# Patient Record
Sex: MALE | Race: WHITE | NOT HISPANIC OR LATINO
[De-identification: names, ages, dates, MRNs, and addresses within clinical notes are randomized per-mention and may not be internally consistent; named-entity substitution may affect disease eponyms.]

---

## 2017-01-09 ENCOUNTER — APPOINTMENT (OUTPATIENT)
Dept: HEMATOLOGY ONCOLOGY | Facility: CLINIC | Age: 72
End: 2017-01-09

## 2017-01-09 VITALS
HEART RATE: 57 BPM | TEMPERATURE: 98.3 F | DIASTOLIC BLOOD PRESSURE: 85 MMHG | SYSTOLIC BLOOD PRESSURE: 166 MMHG | WEIGHT: 219 LBS | RESPIRATION RATE: 12 BRPM

## 2017-01-09 LAB
BASOPHILS # BLD: 0.02 TH/MM3
BASOPHILS NFR BLD: 0.5 %
EOSINOPHIL # BLD: 0.11 TH/MM3
EOSINOPHIL NFR BLD: 2.9 %
ERYTHROCYTE [DISTWIDTH] IN BLOOD BY AUTOMATED COUNT: 13.5 %
GRANULOCYTES # BLD: 2.38 TH/MM3
GRANULOCYTES NFR BLD: 62.1 %
HCT VFR BLD AUTO: 35.7 %
HGB BLD-MCNC: 12 G/DL
IMM GRANULOCYTES # BLD: 0.01 TH/MM3
IMM GRANULOCYTES NFR BLD: 0.3 %
LYMPHOCYTES # BLD: 0.9 TH/MM3
LYMPHOCYTES NFR BLD: 23.5 %
MCH RBC QN AUTO: 29.8 PG
MCHC RBC AUTO-ENTMCNC: 33.6 G/DL
MCV RBC AUTO: 88.6 FL
MONOCYTES # BLD: 0.41 TH/MM3
MONOCYTES NFR BLD: 10.7 %
PLATELET # BLD: 220 TH/MM3
PMV BLD AUTO: 10.4 FL
RBC # BLD AUTO: 4.03 MIL/MM3
WBC # BLD: 3.83 TH/MM3

## 2017-01-10 LAB
ALBUMIN SERPL-MCNC: 3.3 G/DL
ALBUMIN/GLOB SERPL: 0.62
ALP SERPL-CCNC: 49 IU/L
ALT SERPL-CCNC: 22 IU/L
ANION GAP SERPL CALC-SCNC: 13 MEQ/L
AST SERPL-CCNC: 23 IU/L
BILIRUB SERPL-MCNC: 0.8 MG/DL
BUN SERPL-MCNC: 12 MG/DL
BUN/CREAT SERPL: 13.3 %
CALCIUM SERPL-MCNC: 9.3 MG/DL
CHLORIDE SERPL-SCNC: 99 MEQ/L
CO2 SERPL-SCNC: 28 MEQ/L
CREAT SERPL-MCNC: 0.9 MG/DL
GFR SERPL CREATININE-BSD FRML MDRD: 84
GLUCOSE SERPL-MCNC: 82 MG/DL
LACTATE DEHYDROGENASE (NORTH): 120 IU/L
POTASSIUM SERPL-SCNC: 3.7 MMOL/L
PROT SERPL-MCNC: 8.6 G/DL
SODIUM SERPL-SCNC: 140 MEQ/L

## 2017-01-12 LAB
INTERPRETATION SERPL IFE-IMP: NORMAL
KAPPA LC FREE SER-MCNC: 4.8 MG/L
KAPPA LC FREE/LAMBDA FREE SER: 0.06
LAMBDA LC FREE SERPL-MCNC: 80.1 MG/L

## 2017-01-13 LAB
ALBUMIN MFR SERPL ELPH: 40.6 %
ALBUMIN SERPL ELPH-MCNC: 3.4 G/DL
ALBUMIN/GLOB SERPL ELPH: 0.7 ZZ
ALPHA1 GLOB MFR SERPL ELPH: 2.7 %
ALPHA1 GLOB SERPL ELPH-MCNC: 0.2 G/DL
ALPHA2 GLOB MFR SERPL ELPH: 7.4 %
ALPHA2 GLOB SERPL ELPH-MCNC: 0.6 G/DL
B-GLOBULIN SERPL ELPH-MCNC: 3.8 G/DL
BETA1 GLOB MFR SERPL ELPH: 45 %
GAMMA GLOB MFR SERPL ELPH: 4.3 %
GAMMA GLOB SERPL ELPH-MCNC: 0.4 G/DL
IGA FLD-MCNC: 3190 MG/DL
M PROTEIN MFR SERPL ELPH: 41.4 %
M-SPIKE SPE (NORTH): 3.5 G/DL
PROT PATTERN SERPL ELPH-IMP: 8.4 G/DL
PROT PATTERN SERPL ELPH-IMP: NORMAL
PROT SERPL-MCNC: 8.4 G/DL

## 2017-01-27 ENCOUNTER — RESULT REVIEW (OUTPATIENT)
Age: 72
End: 2017-01-27

## 2017-01-27 ENCOUNTER — APPOINTMENT (OUTPATIENT)
Dept: HEMATOLOGY ONCOLOGY | Facility: CLINIC | Age: 72
End: 2017-01-27

## 2017-01-27 VITALS
DIASTOLIC BLOOD PRESSURE: 93 MMHG | TEMPERATURE: 97.6 F | WEIGHT: 220 LBS | HEART RATE: 54 BPM | RESPIRATION RATE: 12 BRPM | SYSTOLIC BLOOD PRESSURE: 163 MMHG | HEIGHT: 70 IN | BODY MASS INDEX: 31.5 KG/M2

## 2017-01-30 ENCOUNTER — RESULT REVIEW (OUTPATIENT)
Age: 72
End: 2017-01-30

## 2017-02-21 LAB — SEND OUT TEST (NORTH): NORMAL

## 2017-03-01 ENCOUNTER — APPOINTMENT (OUTPATIENT)
Age: 72
End: 2017-03-01

## 2017-03-01 ENCOUNTER — OUTPATIENT (OUTPATIENT)
Dept: OUTPATIENT SERVICES | Facility: HOSPITAL | Age: 72
LOS: 1 days | Discharge: HOME | End: 2017-03-01

## 2017-03-01 DIAGNOSIS — Z00.00 ENCOUNTER FOR GENERAL ADULT MEDICAL EXAMINATION W/OUT ABNORMAL FINDINGS: ICD-10-CM

## 2017-03-01 DIAGNOSIS — C90.00 MULTIPLE MYELOMA NOT HAVING ACHIEVED REMISSION: ICD-10-CM

## 2017-03-02 LAB
ANION GAP SERPL CALC-SCNC: 13 MEQ/L
BUN SERPL-MCNC: 13 MG/DL
BUN/CREAT SERPL: 12.7 %
CALCIUM SERPL-MCNC: 9.1 MG/DL
CHLORIDE SERPL-SCNC: 99 MEQ/L
CO2 SERPL-SCNC: 29 MEQ/L
CREAT SERPL-MCNC: 1.02 MG/DL
GFR SERPL CREATININE-BSD FRML MDRD: 72
GLUCOSE SERPL-MCNC: 95 MG/DL
POTASSIUM SERPL-SCNC: 4 MMOL/L
SODIUM SERPL-SCNC: 141 MEQ/L

## 2017-03-28 ENCOUNTER — OUTPATIENT (OUTPATIENT)
Dept: OUTPATIENT SERVICES | Facility: HOSPITAL | Age: 72
LOS: 1 days | Discharge: HOME | End: 2017-03-28

## 2017-06-27 DIAGNOSIS — E88.09 OTHER DISORDERS OF PLASMA-PROTEIN METABOLISM, NOT ELSEWHERE CLASSIFIED: ICD-10-CM

## 2017-07-24 ENCOUNTER — RESULT REVIEW (OUTPATIENT)
Age: 72
End: 2017-07-24

## 2017-07-24 ENCOUNTER — APPOINTMENT (OUTPATIENT)
Dept: HEMATOLOGY ONCOLOGY | Facility: CLINIC | Age: 72
End: 2017-07-24

## 2017-07-24 ENCOUNTER — OUTPATIENT (OUTPATIENT)
Dept: OUTPATIENT SERVICES | Facility: HOSPITAL | Age: 72
LOS: 1 days | Discharge: HOME | End: 2017-07-24

## 2017-07-24 VITALS
RESPIRATION RATE: 12 BRPM | BODY MASS INDEX: 31.78 KG/M2 | DIASTOLIC BLOOD PRESSURE: 94 MMHG | WEIGHT: 222 LBS | TEMPERATURE: 98.6 F | SYSTOLIC BLOOD PRESSURE: 158 MMHG | HEART RATE: 54 BPM | HEIGHT: 70 IN

## 2017-07-25 DIAGNOSIS — C90.00 MULTIPLE MYELOMA NOT HAVING ACHIEVED REMISSION: ICD-10-CM

## 2017-11-06 ENCOUNTER — OUTPATIENT (OUTPATIENT)
Dept: OUTPATIENT SERVICES | Facility: HOSPITAL | Age: 72
LOS: 1 days | Discharge: HOME | End: 2017-11-06

## 2017-11-06 ENCOUNTER — APPOINTMENT (OUTPATIENT)
Dept: HEMATOLOGY ONCOLOGY | Facility: CLINIC | Age: 72
End: 2017-11-06

## 2017-11-06 ENCOUNTER — RESULT REVIEW (OUTPATIENT)
Age: 72
End: 2017-11-06

## 2017-11-06 VITALS
BODY MASS INDEX: 32.64 KG/M2 | RESPIRATION RATE: 12 BRPM | DIASTOLIC BLOOD PRESSURE: 94 MMHG | HEART RATE: 60 BPM | WEIGHT: 228 LBS | SYSTOLIC BLOOD PRESSURE: 150 MMHG | TEMPERATURE: 97.3 F | HEIGHT: 70 IN

## 2017-11-07 DIAGNOSIS — C90.00 MULTIPLE MYELOMA NOT HAVING ACHIEVED REMISSION: ICD-10-CM

## 2018-04-02 ENCOUNTER — LABORATORY RESULT (OUTPATIENT)
Age: 73
End: 2018-04-02

## 2018-04-02 ENCOUNTER — APPOINTMENT (OUTPATIENT)
Dept: HEMATOLOGY ONCOLOGY | Facility: CLINIC | Age: 73
End: 2018-04-02

## 2018-04-02 ENCOUNTER — OUTPATIENT (OUTPATIENT)
Dept: OUTPATIENT SERVICES | Facility: HOSPITAL | Age: 73
LOS: 1 days | Discharge: HOME | End: 2018-04-02

## 2018-04-02 VITALS — DIASTOLIC BLOOD PRESSURE: 103 MMHG | SYSTOLIC BLOOD PRESSURE: 167 MMHG

## 2018-04-02 VITALS
DIASTOLIC BLOOD PRESSURE: 96 MMHG | TEMPERATURE: 97.3 F | SYSTOLIC BLOOD PRESSURE: 182 MMHG | BODY MASS INDEX: 33.36 KG/M2 | WEIGHT: 233 LBS | HEIGHT: 70 IN

## 2018-04-02 LAB
HCT VFR BLD CALC: 38.1 %
HGB BLD-MCNC: 13 G/DL
MCHC RBC-ENTMCNC: 30.4 PG
MCHC RBC-ENTMCNC: 34.1 G/DL
MCV RBC AUTO: 89.2 FL
PLATELET # BLD AUTO: 206 K/UL
PMV BLD: 10.7 FL
RBC # BLD: 4.27 M/UL
RBC # FLD: 13.7 %
WBC # FLD AUTO: 4.93 K/UL

## 2018-04-03 LAB
ALBUMIN SERPL ELPH-MCNC: 3.7 G/DL
ALP BLD-CCNC: 53 U/L
ALT SERPL-CCNC: 16 U/L
ANION GAP SERPL CALC-SCNC: 15 MMOL/L
AST SERPL-CCNC: 19 U/L
BILIRUB SERPL-MCNC: 0.4 MG/DL
BUN SERPL-MCNC: 13 MG/DL
CALCIUM SERPL-MCNC: 9.5 MG/DL
CHLORIDE SERPL-SCNC: 98 MMOL/L
CO2 SERPL-SCNC: 29 MMOL/L
CREAT SERPL-MCNC: 1 MG/DL
GLUCOSE SERPL-MCNC: 89 MG/DL
IGA SER QL IEP: 3270 MG/DL
IGG SER QL IEP: 367 MG/DL
IGM SER QL IEP: 31 MG/DL
LDH SERPL-CCNC: 134 U/L
POTASSIUM SERPL-SCNC: 4.1 MMOL/L
PROT SERPL-MCNC: 8.3 G/DL
SODIUM SERPL-SCNC: 142 MMOL/L

## 2018-04-04 DIAGNOSIS — C90.00 MULTIPLE MYELOMA NOT HAVING ACHIEVED REMISSION: ICD-10-CM

## 2018-04-05 LAB
ALBUMIN MFR SERPL ELPH: 41.8 %
ALBUMIN SERPL-MCNC: 3.5 G/DL
ALBUMIN/GLOB SERPL: 0.7 RATIO
ALPHA1 GLOB MFR SERPL ELPH: 2.6 %
ALPHA1 GLOB SERPL ELPH-MCNC: 0.2 G/DL
ALPHA2 GLOB MFR SERPL ELPH: 7.4 %
ALPHA2 GLOB SERPL ELPH-MCNC: 0.6 G/DL
B-GLOBULIN MFR SERPL ELPH: 43.9 %
B-GLOBULIN SERPL ELPH-MCNC: 3.7 G/DL
FREE KAPPA URINE: 6.06 MG/L
FREE KAPPA/LAMDA RATIO: 2.45
FREE LAMDA URINE: 2.47 MG/L
GAMMA GLOB FLD ELPH-MCNC: 0.4 G/DL
GAMMA GLOB MFR SERPL ELPH: 4.2 %
INTERPRETATION SERPL IEP-IMP: NORMAL
M PROTEIN MFR SERPL ELPH: 35.7 %
M PROTEIN SPEC IFE-MCNC: NORMAL
MONOCLON BAND OBS SERPL: 3 G/DL
PROT SERPL-MCNC: 8.4 G/DL
PROT SERPL-MCNC: 8.4 G/DL

## 2018-09-06 ENCOUNTER — APPOINTMENT (OUTPATIENT)
Dept: HEMATOLOGY ONCOLOGY | Facility: CLINIC | Age: 73
End: 2018-09-06

## 2018-09-06 ENCOUNTER — LABORATORY RESULT (OUTPATIENT)
Age: 73
End: 2018-09-06

## 2018-09-06 ENCOUNTER — OUTPATIENT (OUTPATIENT)
Dept: OUTPATIENT SERVICES | Facility: HOSPITAL | Age: 73
LOS: 1 days | Discharge: HOME | End: 2018-09-06

## 2018-09-06 VITALS
TEMPERATURE: 96.8 F | BODY MASS INDEX: 32.93 KG/M2 | SYSTOLIC BLOOD PRESSURE: 168 MMHG | HEART RATE: 49 BPM | HEIGHT: 70 IN | RESPIRATION RATE: 14 BRPM | WEIGHT: 230 LBS | DIASTOLIC BLOOD PRESSURE: 85 MMHG

## 2018-09-06 LAB
ALBUMIN SERPL ELPH-MCNC: 3.5 G/DL
ALP BLD-CCNC: 54 U/L
ALT SERPL-CCNC: 19 U/L
ANION GAP SERPL CALC-SCNC: 16 MMOL/L
AST SERPL-CCNC: 25 U/L
BILIRUB SERPL-MCNC: 0.5 MG/DL
BUN SERPL-MCNC: 17 MG/DL
CALCIUM SERPL-MCNC: 9.2 MG/DL
CHLORIDE SERPL-SCNC: 99 MMOL/L
CO2 SERPL-SCNC: 27 MMOL/L
CREAT SERPL-MCNC: 1 MG/DL
GLUCOSE SERPL-MCNC: 88 MG/DL
HCT VFR BLD CALC: 36.6 %
HGB BLD-MCNC: 12.2 G/DL
MCHC RBC-ENTMCNC: 29.8 PG
MCHC RBC-ENTMCNC: 33.3 G/DL
MCV RBC AUTO: 89.5 FL
PLATELET # BLD AUTO: 180 K/UL
PMV BLD: 10.5 FL
POTASSIUM SERPL-SCNC: 3.9 MMOL/L
PROT SERPL-MCNC: 7.8 G/DL
RBC # BLD: 4.09 M/UL
RBC # FLD: 13.9 %
SODIUM SERPL-SCNC: 142 MMOL/L
WBC # FLD AUTO: 3.53 K/UL

## 2018-09-07 LAB
DEPRECATED KAPPA LC FREE/LAMBDA SER: 0.04 RATIO
IGA SER QL IEP: 3511 MG/DL
KAPPA LC CSF-MCNC: 9.51 MG/DL
KAPPA LC SERPL-MCNC: 0.38 MG/DL

## 2018-09-10 LAB
ALBUMIN MFR SERPL ELPH: 41.7 %
ALBUMIN SERPL-MCNC: 3.4 G/DL
ALBUMIN/GLOB SERPL: 0.7 RATIO
ALPHA1 GLOB MFR SERPL ELPH: 2.8 %
ALPHA1 GLOB SERPL ELPH-MCNC: 0.2 G/DL
ALPHA2 GLOB MFR SERPL ELPH: 7.8 %
ALPHA2 GLOB SERPL ELPH-MCNC: 0.6 G/DL
B-GLOBULIN MFR SERPL ELPH: 43.2 %
B-GLOBULIN SERPL ELPH-MCNC: 3.5 G/DL
GAMMA GLOB FLD ELPH-MCNC: 0.4 G/DL
GAMMA GLOB MFR SERPL ELPH: 4.5 %
INTERPRETATION SERPL IEP-IMP: NORMAL
M PROTEIN MFR SERPL ELPH: 30.9 %
M PROTEIN SPEC IFE-MCNC: NORMAL
MONOCLON BAND OBS SERPL: 2.5 G/DL
PROT SERPL-MCNC: 8.1 G/DL
PROT SERPL-MCNC: 8.1 G/DL

## 2018-11-06 DIAGNOSIS — C90.00 MULTIPLE MYELOMA NOT HAVING ACHIEVED REMISSION: ICD-10-CM

## 2019-03-07 ENCOUNTER — APPOINTMENT (OUTPATIENT)
Dept: HEMATOLOGY ONCOLOGY | Facility: CLINIC | Age: 74
End: 2019-03-07

## 2019-03-07 ENCOUNTER — LABORATORY RESULT (OUTPATIENT)
Age: 74
End: 2019-03-07

## 2019-03-07 ENCOUNTER — OUTPATIENT (OUTPATIENT)
Dept: OUTPATIENT SERVICES | Facility: HOSPITAL | Age: 74
LOS: 1 days | Discharge: HOME | End: 2019-03-07

## 2019-03-07 VITALS
TEMPERATURE: 98.8 F | WEIGHT: 230 LBS | HEART RATE: 50 BPM | RESPIRATION RATE: 14 BRPM | BODY MASS INDEX: 45.16 KG/M2 | DIASTOLIC BLOOD PRESSURE: 90 MMHG | HEIGHT: 60 IN | SYSTOLIC BLOOD PRESSURE: 170 MMHG

## 2019-03-07 LAB
ALBUMIN SERPL ELPH-MCNC: 3.6 G/DL
ALP BLD-CCNC: 53 U/L
ALT SERPL-CCNC: 18 U/L
ANION GAP SERPL CALC-SCNC: 15 MMOL/L
AST SERPL-CCNC: 21 U/L
BILIRUB SERPL-MCNC: 0.4 MG/DL
BUN SERPL-MCNC: 12 MG/DL
CALCIUM SERPL-MCNC: 9.4 MG/DL
CHLORIDE SERPL-SCNC: 98 MMOL/L
CO2 SERPL-SCNC: 28 MMOL/L
CREAT SERPL-MCNC: 1 MG/DL
GLUCOSE SERPL-MCNC: 90 MG/DL
HCT VFR BLD CALC: 38.1 %
HGB BLD-MCNC: 12.7 G/DL
LDH SERPL-CCNC: 136 U/L
MCHC RBC-ENTMCNC: 30.3 PG
MCHC RBC-ENTMCNC: 33.3 G/DL
MCV RBC AUTO: 90.9 FL
PLATELET # BLD AUTO: 209 K/UL
PMV BLD: 10.7 FL
POTASSIUM SERPL-SCNC: 4.1 MMOL/L
PROT SERPL-MCNC: 8.2 G/DL
RBC # BLD: 4.19 M/UL
RBC # FLD: 14.1 %
SODIUM SERPL-SCNC: 141 MMOL/L
WBC # FLD AUTO: 3.13 K/UL

## 2019-03-07 NOTE — ASSESSMENT
[FreeTextEntry1] : IgA multiple myeloma, clinically stable. However, his IgA level was climbing up very slowly. The patient was originally diagnosed with monoclonal gammopathy in the mid 90's.\par \par We will proceed with blood work again including CBC, CMP, SPEP with IFES, LDH, igA, free light chains.\par Further recommendations after the above results are completed. \par \par If all stable, he will be seen again in 6 months for follow up.\par \par All questions answered.

## 2019-03-07 NOTE — REVIEW OF SYSTEMS
[Vision Problems] : vision problems [Loss of Hearing] : loss of hearing [Insomnia] : insomnia [Negative] : Heme/Lymph

## 2019-03-07 NOTE — PHYSICAL EXAM
[Fully active, able to carry on all pre-disease performance without restriction] : Status 0 - Fully active, able to carry on all pre-disease performance without restriction [Normal] : grossly intact [de-identified] : But somewhat overweight [de-identified] : But decreased hearing [de-identified] : Mild arthritic changes

## 2019-03-07 NOTE — HISTORY OF PRESENT ILLNESS
[Disease:__________________________] : Disease: [unfilled] [de-identified] : The patient is coming for his regularly scheduled follow up for his IgA lambda plasma cell dyscrasia/myeloma.\par Since his last visit, he hasn't had any new problems.\par He has not felt any new lumps. Sometimes he gets night sweats only when he sleeps on the right side. \par No problems with bowel movements but the urine stream is a little weak.\par No cough or shortness of breath.\par \par He was started on an eye drop recently for the beginning of glaucoma.

## 2019-03-08 DIAGNOSIS — C90.00 MULTIPLE MYELOMA NOT HAVING ACHIEVED REMISSION: ICD-10-CM

## 2019-03-08 LAB
DEPRECATED KAPPA LC FREE/LAMBDA SER: 0.06 RATIO
IGA SER QL IEP: 3276 MG/DL
KAPPA LC CSF-MCNC: 7.17 MG/DL
KAPPA LC SERPL-MCNC: 0.41 MG/DL

## 2019-03-12 LAB
ALBUMIN MFR SERPL ELPH: 41.6 %
ALBUMIN SERPL-MCNC: 3.4 G/DL
ALBUMIN/GLOB SERPL: 0.7 RATIO
ALPHA1 GLOB MFR SERPL ELPH: 2.6 %
ALPHA1 GLOB SERPL ELPH-MCNC: 0.2 G/DL
ALPHA2 GLOB MFR SERPL ELPH: 7.2 %
ALPHA2 GLOB SERPL ELPH-MCNC: 0.6 G/DL
B-GLOBULIN MFR SERPL ELPH: 44.8 %
B-GLOBULIN SERPL ELPH-MCNC: 3.6 G/DL
GAMMA GLOB FLD ELPH-MCNC: 0.3 G/DL
GAMMA GLOB MFR SERPL ELPH: 3.8 %
INTERPRETATION SERPL IEP-IMP: NORMAL
M PROTEIN MFR SERPL ELPH: 27.6 %
M PROTEIN SPEC IFE-MCNC: NORMAL
MONOCLON BAND OBS SERPL: 2.2 G/DL
PROT SERPL-MCNC: 8.1 G/DL
PROT SERPL-MCNC: 8.1 G/DL

## 2019-10-03 ENCOUNTER — APPOINTMENT (OUTPATIENT)
Dept: HEMATOLOGY ONCOLOGY | Facility: CLINIC | Age: 74
End: 2019-10-03
Payer: MEDICARE

## 2019-10-03 ENCOUNTER — OUTPATIENT (OUTPATIENT)
Dept: OUTPATIENT SERVICES | Facility: HOSPITAL | Age: 74
LOS: 1 days | Discharge: HOME | End: 2019-10-03

## 2019-10-03 ENCOUNTER — LABORATORY RESULT (OUTPATIENT)
Age: 74
End: 2019-10-03

## 2019-10-03 VITALS
BODY MASS INDEX: 32.35 KG/M2 | WEIGHT: 226 LBS | HEIGHT: 70 IN | HEART RATE: 52 BPM | TEMPERATURE: 97.1 F | SYSTOLIC BLOOD PRESSURE: 178 MMHG | DIASTOLIC BLOOD PRESSURE: 96 MMHG | RESPIRATION RATE: 14 BRPM

## 2019-10-03 LAB
ALBUMIN SERPL ELPH-MCNC: 3.7 G/DL
ALP BLD-CCNC: 56 U/L
ALT SERPL-CCNC: 12 U/L
ANION GAP SERPL CALC-SCNC: 15 MMOL/L
AST SERPL-CCNC: 17 U/L
BILIRUB SERPL-MCNC: 0.5 MG/DL
BUN SERPL-MCNC: 17 MG/DL
CALCIUM SERPL-MCNC: 9.3 MG/DL
CHLORIDE SERPL-SCNC: 97 MMOL/L
CO2 SERPL-SCNC: 27 MMOL/L
CREAT SERPL-MCNC: 1.1 MG/DL
GLUCOSE SERPL-MCNC: 103 MG/DL
HCT VFR BLD CALC: 35.9 %
HGB BLD-MCNC: 12.1 G/DL
LDH SERPL-CCNC: 113 U/L
MCHC RBC-ENTMCNC: 30.3 PG
MCHC RBC-ENTMCNC: 33.7 G/DL
MCV RBC AUTO: 89.8 FL
PLATELET # BLD AUTO: 196 K/UL
PMV BLD: 10.4 FL
POTASSIUM SERPL-SCNC: 3.8 MMOL/L
PROT SERPL-MCNC: 8.3 G/DL
RBC # BLD: 4 M/UL
RBC # FLD: 13.3 %
SODIUM SERPL-SCNC: 139 MMOL/L
WBC # FLD AUTO: 3.29 K/UL

## 2019-10-03 PROCEDURE — 99213 OFFICE O/P EST LOW 20 MIN: CPT

## 2019-10-03 NOTE — ASSESSMENT
[FreeTextEntry1] : IgA multiple myeloma, practically smoldering but IgA hovering slightly above 3000 with no persistent progression, overall stable.\par \par We will repeat the blood work including a CBC, CMP, LDH, SPEP with IFES, IgA, free light chains. Further recommendations as needed after those results are available.\par \par All questions answered.\par \par If all stable, the patient will be seen again in 6 months for follow up.

## 2019-10-03 NOTE — HISTORY OF PRESENT ILLNESS
[Disease: _____________________] : Disease: [unfilled] [de-identified] : IgA [de-identified] : The patient is coming for his regularly scheduled follow up for his IgA myeloma. Presently, he has no new particular complaints. he had mild joint discomfort.\par Otherwise, the appetite is good. No significant problem with urine or bowel movements. No new headache or dizziness. No new cough  or shortness of breath. No fever or night sweats.\par He is on no new medications. \par He is up to date with his screenings.

## 2019-10-03 NOTE — PHYSICAL EXAM
[Fully active, able to carry on all pre-disease performance without restriction] : Status 0 - Fully active, able to carry on all pre-disease performance without restriction [Normal] : affect appropriate [de-identified] : Mild arthritic changes. [de-identified] : But somewhat overweight.

## 2019-10-07 DIAGNOSIS — C90.00 MULTIPLE MYELOMA NOT HAVING ACHIEVED REMISSION: ICD-10-CM

## 2019-10-07 LAB
ALBUMIN MFR SERPL ELPH: 41.7 %
ALBUMIN SERPL-MCNC: 3.5 G/DL
ALBUMIN/GLOB SERPL: 0.7 RATIO
ALPHA1 GLOB MFR SERPL ELPH: 2.7 %
ALPHA1 GLOB SERPL ELPH-MCNC: 0.2 G/DL
ALPHA2 GLOB MFR SERPL ELPH: 8 %
ALPHA2 GLOB SERPL ELPH-MCNC: 0.7 G/DL
B-GLOBULIN MFR SERPL ELPH: 43.9 %
B-GLOBULIN SERPL ELPH-MCNC: 3.6 G/DL
DEPRECATED KAPPA LC FREE/LAMBDA SER: 0.05 RATIO
GAMMA GLOB FLD ELPH-MCNC: 0.3 G/DL
GAMMA GLOB MFR SERPL ELPH: 3.7 %
IGA SER QL IEP: 3152 MG/DL
INTERPRETATION SERPL IEP-IMP: NORMAL
KAPPA LC CSF-MCNC: 9.2 MG/DL
KAPPA LC SERPL-MCNC: 0.48 MG/DL
M PROTEIN MFR SERPL ELPH: 34.8 %
M PROTEIN SPEC IFE-MCNC: NORMAL
MONOCLON BAND OBS SERPL: 2.9 G/DL
PROT SERPL-MCNC: 8.3 G/DL
PROT SERPL-MCNC: 8.3 G/DL

## 2020-04-13 ENCOUNTER — APPOINTMENT (OUTPATIENT)
Dept: HEMATOLOGY ONCOLOGY | Facility: CLINIC | Age: 75
End: 2020-04-13

## 2020-06-15 ENCOUNTER — OUTPATIENT (OUTPATIENT)
Dept: OUTPATIENT SERVICES | Facility: HOSPITAL | Age: 75
LOS: 1 days | Discharge: HOME | End: 2020-06-15

## 2020-06-15 ENCOUNTER — APPOINTMENT (OUTPATIENT)
Dept: HEMATOLOGY ONCOLOGY | Facility: CLINIC | Age: 75
End: 2020-06-15
Payer: MEDICARE

## 2020-06-15 ENCOUNTER — LABORATORY RESULT (OUTPATIENT)
Age: 75
End: 2020-06-15

## 2020-06-15 VITALS
DIASTOLIC BLOOD PRESSURE: 98 MMHG | TEMPERATURE: 98.1 F | BODY MASS INDEX: 31.78 KG/M2 | HEIGHT: 70 IN | RESPIRATION RATE: 14 BRPM | WEIGHT: 222 LBS | SYSTOLIC BLOOD PRESSURE: 178 MMHG | HEART RATE: 55 BPM

## 2020-06-15 DIAGNOSIS — D72.819 DECREASED WHITE BLOOD CELL COUNT, UNSPECIFIED: ICD-10-CM

## 2020-06-15 DIAGNOSIS — E88.09 OTHER DISORDERS OF PLASMA-PROTEIN METABOLISM, NOT ELSEWHERE CLASSIFIED: ICD-10-CM

## 2020-06-15 LAB
HCT VFR BLD CALC: 36.7 %
HGB BLD-MCNC: 12.3 G/DL
MCHC RBC-ENTMCNC: 30.4 PG
MCHC RBC-ENTMCNC: 33.5 G/DL
MCV RBC AUTO: 90.6 FL
PLATELET # BLD AUTO: 195 K/UL
PMV BLD: 10.5 FL
RBC # BLD: 4.05 M/UL
RBC # FLD: 14.3 %
WBC # FLD AUTO: 3.46 K/UL

## 2020-06-15 PROCEDURE — 99213 OFFICE O/P EST LOW 20 MIN: CPT

## 2020-06-16 LAB
ALBUMIN SERPL ELPH-MCNC: 3.8 G/DL
ALP BLD-CCNC: 54 U/L
ALT SERPL-CCNC: 15 U/L
ANION GAP SERPL CALC-SCNC: 17 MMOL/L
AST SERPL-CCNC: 22 U/L
BILIRUB DIRECT SERPL-MCNC: <0.2 MG/DL
BILIRUB INDIRECT SERPL-MCNC: >0.2 MG/DL
BILIRUB SERPL-MCNC: 0.4 MG/DL
BUN SERPL-MCNC: 15 MG/DL
CALCIUM SERPL-MCNC: 9.2 MG/DL
CHLORIDE SERPL-SCNC: 96 MMOL/L
CO2 SERPL-SCNC: 27 MMOL/L
CREAT SERPL-MCNC: 1 MG/DL
DEPRECATED KAPPA LC FREE/LAMBDA SER: 0.06 RATIO
GLUCOSE SERPL-MCNC: 88 MG/DL
IGG SER QL IEP: 337 MG/DL
IGM SER QL IEP: 40 MG/DL
KAPPA LC CSF-MCNC: 7.39 MG/DL
KAPPA LC SERPL-MCNC: 0.47 MG/DL
POTASSIUM SERPL-SCNC: 3.7 MMOL/L
PROT SERPL-MCNC: 8.4 G/DL
SODIUM SERPL-SCNC: 140 MMOL/L

## 2020-06-16 NOTE — PHYSICAL EXAM
[Fully active, able to carry on all pre-disease performance without restriction] : Status 0 - Fully active, able to carry on all pre-disease performance without restriction [Normal] : affect appropriate [de-identified] : But somewhat overweight. [de-identified] : Mild arthritic changes.

## 2020-06-16 NOTE — ASSESSMENT
[FreeTextEntry1] : IgA multiple myeloma, practically smoldering but IgA hovering slightly above 3000 with no persistent progression, overall stable, with no new symptoms or signs. No CRAB manifestations. No features of high risk smoldering myeloma either.\par \par We will repeat the blood work including a CBC, BMP, LFTs, LDH, SPEP with IFES, IgA, M, G levels, free light chains. Further recommendations as needed after those results are available.\par \par All questions answered.\par \par If all stable, the patient will be seen again in 6 months for follow up.

## 2020-06-16 NOTE — HISTORY OF PRESENT ILLNESS
[Disease: _____________________] : Disease: [unfilled] [de-identified] : The patient is coming for his regularly scheduled follow up for his IgA myeloma. \par Presently, he has no new particular complaints. \par Otherwise, the appetite is good. No significant problem with urine or bowel movements. No new headache or dizziness. No new cough  or shortness of breath. No fever or night sweats. No bone pains. No new episodes of seizures.\par He is on no new medications. \par He is up to date with his screenings. [de-identified] : IgA

## 2020-06-17 LAB
ALBUMIN MFR SERPL ELPH: 42.9 %
ALBUMIN SERPL-MCNC: 3.6 G/DL
ALBUMIN/GLOB SERPL: 0.8 RATIO
ALPHA1 GLOB MFR SERPL ELPH: 2.5 %
ALPHA1 GLOB SERPL ELPH-MCNC: 0.2 G/DL
ALPHA2 GLOB MFR SERPL ELPH: 6.9 %
ALPHA2 GLOB SERPL ELPH-MCNC: 0.6 G/DL
B-GLOBULIN MFR SERPL ELPH: 43.3 %
B-GLOBULIN SERPL ELPH-MCNC: 3.6 G/DL
GAMMA GLOB FLD ELPH-MCNC: 0.4 G/DL
GAMMA GLOB MFR SERPL ELPH: 4.4 %
IGA SER QL IEP: 355 MG/DL
INTERPRETATION SERPL IEP-IMP: NORMAL
M PROTEIN MFR SERPL ELPH: 32 %
M PROTEIN SPEC IFE-MCNC: NORMAL
MONOCLON BAND OBS SERPL: 2.7 G/DL
PROT SERPL-MCNC: 8.3 G/DL
PROT SERPL-MCNC: 8.3 G/DL

## 2020-06-19 DIAGNOSIS — D72.819 DECREASED WHITE BLOOD CELL COUNT, UNSPECIFIED: ICD-10-CM

## 2020-06-19 DIAGNOSIS — E88.09 OTHER DISORDERS OF PLASMA-PROTEIN METABOLISM, NOT ELSEWHERE CLASSIFIED: ICD-10-CM

## 2020-12-14 ENCOUNTER — LABORATORY RESULT (OUTPATIENT)
Age: 75
End: 2020-12-14

## 2020-12-14 ENCOUNTER — APPOINTMENT (OUTPATIENT)
Dept: HEMATOLOGY ONCOLOGY | Facility: CLINIC | Age: 75
End: 2020-12-14
Payer: MEDICARE

## 2020-12-14 ENCOUNTER — OUTPATIENT (OUTPATIENT)
Dept: OUTPATIENT SERVICES | Facility: HOSPITAL | Age: 75
LOS: 1 days | Discharge: HOME | End: 2020-12-14

## 2020-12-14 VITALS
BODY MASS INDEX: 31.35 KG/M2 | SYSTOLIC BLOOD PRESSURE: 170 MMHG | HEART RATE: 53 BPM | HEIGHT: 70 IN | TEMPERATURE: 97.6 F | DIASTOLIC BLOOD PRESSURE: 92 MMHG | WEIGHT: 219 LBS

## 2020-12-14 DIAGNOSIS — I10 ESSENTIAL (PRIMARY) HYPERTENSION: ICD-10-CM

## 2020-12-14 LAB
HCT VFR BLD CALC: 36.6 %
HGB BLD-MCNC: 12.2 G/DL
MCHC RBC-ENTMCNC: 30.3 PG
MCHC RBC-ENTMCNC: 33.3 G/DL
MCV RBC AUTO: 91 FL
PLATELET # BLD AUTO: 188 K/UL
PMV BLD: 10.6 FL
RBC # BLD: 4.02 M/UL
RBC # FLD: 13.2 %
WBC # FLD AUTO: 4.1 K/UL

## 2020-12-14 PROCEDURE — 99213 OFFICE O/P EST LOW 20 MIN: CPT

## 2020-12-14 NOTE — PHYSICAL EXAM
[Fully active, able to carry on all pre-disease performance without restriction] : Status 0 - Fully active, able to carry on all pre-disease performance without restriction [Normal] : affect appropriate [de-identified] : Mild arthritic changes

## 2020-12-14 NOTE — HISTORY OF PRESENT ILLNESS
[Disease:__________________________] : Disease: [unfilled] [de-identified] : The patient is coming for his regularly scheduled follow up for his smoldering multiple myeloma with IgA.\par The patient was doing quite well until about 2 weeks ago, remaining physically quite active, when her mother who will be 100 years old soon, fell and broke her hip and was hospitalized and underwent surgery. She was then sent to a rehabilitation center.\par Otherwise, the patient is denying any other particular problems. Earlier in the year, he was feeling somewhat fatigued but that sensation disappeared.\par No new medications. \par He had his last colonoscopy about a year ago and it was negative.

## 2020-12-14 NOTE — REVIEW OF SYSTEMS
[Recent Change In Weight] : ~T recent weight change [Loss of Hearing] : loss of hearing [Joint Stiffness] : joint stiffness [Muscle Pain] : muscle pain [Negative] : Heme/Lymph [FreeTextEntry2] : Went down to 218 but has regained weight. He wants to lose more. [FreeTextEntry4] : Mild middle range loss [FreeTextEntry9] : Mostly mid-arm (right) discomfort

## 2020-12-14 NOTE — ASSESSMENT
[FreeTextEntry1] : Smoldering myeloma with IgA, clinically stable.\par We will repeat the blood work including CBC, CMP, LDH, SPEP with IFES, IgA, free light chains. I don't see any reason for any further radiological studies at this time.\par \par Further recommendations after the above results are available.\par \par All questions answered. \par \par If all stable, the patient will be seen again in 6 months for follow up.

## 2020-12-15 LAB
ALBUMIN SERPL ELPH-MCNC: 3.8 G/DL
ALP BLD-CCNC: 60 U/L
ALT SERPL-CCNC: 14 U/L
ANION GAP SERPL CALC-SCNC: 13 MMOL/L
AST SERPL-CCNC: 19 U/L
BILIRUB SERPL-MCNC: 0.4 MG/DL
BUN SERPL-MCNC: 14 MG/DL
CALCIUM SERPL-MCNC: 9.7 MG/DL
CHLORIDE SERPL-SCNC: 99 MMOL/L
CO2 SERPL-SCNC: 28 MMOL/L
CREAT SERPL-MCNC: 1 MG/DL
DEPRECATED KAPPA LC FREE/LAMBDA SER: 0.08 RATIO
GLUCOSE SERPL-MCNC: 88 MG/DL
KAPPA LC CSF-MCNC: 6.45 MG/DL
KAPPA LC SERPL-MCNC: 0.5 MG/DL
LDH SERPL-CCNC: 124 U/L
POTASSIUM SERPL-SCNC: 3.9 MMOL/L
PROT SERPL-MCNC: 8.3 G/DL
SODIUM SERPL-SCNC: 140 MMOL/L

## 2020-12-16 LAB
ALBUMIN MFR SERPL ELPH: 43.1 %
ALBUMIN SERPL-MCNC: 3.6 G/DL
ALBUMIN/GLOB SERPL: 0.8 RATIO
ALPHA1 GLOB MFR SERPL ELPH: 2.6 %
ALPHA1 GLOB SERPL ELPH-MCNC: 0.2 G/DL
ALPHA2 GLOB MFR SERPL ELPH: 7.5 %
ALPHA2 GLOB SERPL ELPH-MCNC: 0.6 G/DL
B-GLOBULIN MFR SERPL ELPH: 42.4 %
B-GLOBULIN SERPL ELPH-MCNC: 3.5 G/DL
GAMMA GLOB FLD ELPH-MCNC: 0.4 G/DL
GAMMA GLOB MFR SERPL ELPH: 4.4 %
IGA SER QL IEP: 3507 MG/DL
INTERPRETATION SERPL IEP-IMP: NORMAL
M PROTEIN MFR SERPL ELPH: 29.2 %
M PROTEIN SPEC IFE-MCNC: NORMAL
MONOCLON BAND OBS SERPL: 2.4 G/DL
PROT SERPL-MCNC: 8.3 G/DL

## 2021-01-06 DIAGNOSIS — C90.00 MULTIPLE MYELOMA NOT HAVING ACHIEVED REMISSION: ICD-10-CM

## 2021-07-08 ENCOUNTER — OUTPATIENT (OUTPATIENT)
Dept: OUTPATIENT SERVICES | Facility: HOSPITAL | Age: 76
LOS: 1 days | Discharge: HOME | End: 2021-07-08

## 2021-07-08 ENCOUNTER — LABORATORY RESULT (OUTPATIENT)
Age: 76
End: 2021-07-08

## 2021-07-08 ENCOUNTER — APPOINTMENT (OUTPATIENT)
Dept: HEMATOLOGY ONCOLOGY | Facility: CLINIC | Age: 76
End: 2021-07-08
Payer: MEDICARE

## 2021-07-08 VITALS
SYSTOLIC BLOOD PRESSURE: 180 MMHG | HEART RATE: 56 BPM | HEIGHT: 70 IN | DIASTOLIC BLOOD PRESSURE: 88 MMHG | BODY MASS INDEX: 30.92 KG/M2 | WEIGHT: 216 LBS | RESPIRATION RATE: 14 BRPM | TEMPERATURE: 97.7 F

## 2021-07-08 DIAGNOSIS — D47.2 MONOCLONAL GAMMOPATHY: ICD-10-CM

## 2021-07-08 LAB
HCT VFR BLD CALC: 35.4 %
HGB BLD-MCNC: 11.8 G/DL
MCHC RBC-ENTMCNC: 30.3 PG
MCHC RBC-ENTMCNC: 33.3 G/DL
MCV RBC AUTO: 91 FL
PLATELET # BLD AUTO: 174 K/UL
PMV BLD: 10.7 FL
RBC # BLD: 3.89 M/UL
RBC # FLD: 13.4 %
WBC # FLD AUTO: 3.46 K/UL

## 2021-07-08 PROCEDURE — 99214 OFFICE O/P EST MOD 30 MIN: CPT

## 2021-07-08 NOTE — ASSESSMENT
[FreeTextEntry1] : 1. Multiple myeloma with IgA, smoldering but was slowly progressing. The history goes back to at least 1996 when he was first seen by us. The patient had at least 2 bone marrow studies in 2012 and 2017.  2. Right neck/posterior submandibular soft tissue mass. It has been there for many years without significant progression. The patient has been aware of it and more than 5 years ago he had a CT of the area. Old records no longer available at this time.  We will repeat the CBC, CMP, LDH, SPEP with IFES, free light chains, quantitative IgA.  If any progression noted, will repeat the bone marrow studies.   All questions answered.   Further recommendations after the above blood results are available and reviewed. The patient may need a follow up sooner than what has been scheduled in the past

## 2021-07-08 NOTE — HISTORY OF PRESENT ILLNESS
[Disease:__________________________] : Disease: [unfilled] [de-identified] : The patient is coming for his regularly scheduled follow up for his smoldering myeloma with IgA.\par Presently, he is denying any new particular complaints. He just has his chronic arthralgias. He is taking care of her elderly mother who is centenarian. She has recovered from her orthopedic problems.\par The patient himself feels well and remains fully functional.\par He is on no new medications but continuing with the previous ones and seeing his other physicians also regularly. [de-identified] : IgA

## 2021-07-08 NOTE — PHYSICAL EXAM
[Fully active, able to carry on all pre-disease performance without restriction] : Status 0 - Fully active, able to carry on all pre-disease performance without restriction [Normal] : affect appropriate [de-identified] : But somewhat overweight. [de-identified] : Right side submandibular fullness/soft mass (not new, has been there for several years). He had a neck CT a few years ago and no suspicious lesion identified. [de-identified] : Arthritic changes noted

## 2021-07-09 DIAGNOSIS — C90.00 MULTIPLE MYELOMA NOT HAVING ACHIEVED REMISSION: ICD-10-CM

## 2021-07-09 LAB
ALBUMIN SERPL ELPH-MCNC: 3.7 G/DL
ALP BLD-CCNC: 59 U/L
ALT SERPL-CCNC: 11 U/L
ANION GAP SERPL CALC-SCNC: 11 MMOL/L
AST SERPL-CCNC: 22 U/L
BILIRUB SERPL-MCNC: 0.6 MG/DL
BUN SERPL-MCNC: 15 MG/DL
CALCIUM SERPL-MCNC: 9.2 MG/DL
CHLORIDE SERPL-SCNC: 97 MMOL/L
CO2 SERPL-SCNC: 28 MMOL/L
CREAT SERPL-MCNC: 1 MG/DL
DEPRECATED KAPPA LC FREE/LAMBDA SER: 0.05 RATIO
GLUCOSE SERPL-MCNC: 92 MG/DL
IGA SER QL IEP: 2676 MG/DL
KAPPA LC CSF-MCNC: 9.37 MG/DL
KAPPA LC SERPL-MCNC: 0.48 MG/DL
LDH SERPL-CCNC: 130 U/L
POTASSIUM SERPL-SCNC: 4.1 MMOL/L
PROT SERPL-MCNC: 7.8 G/DL
SODIUM SERPL-SCNC: 136 MMOL/L

## 2021-07-14 LAB
ALBUMIN MFR SERPL ELPH: 40.6 %
ALBUMIN SERPL-MCNC: 3.2 G/DL
ALBUMIN/GLOB SERPL: 0.7 RATIO
ALPHA1 GLOB MFR SERPL ELPH: 2.6 %
ALPHA1 GLOB SERPL ELPH-MCNC: 0.2 G/DL
ALPHA2 GLOB MFR SERPL ELPH: 7.8 %
ALPHA2 GLOB SERPL ELPH-MCNC: 0.6 G/DL
B-GLOBULIN MFR SERPL ELPH: 44.8 %
B-GLOBULIN SERPL ELPH-MCNC: 3.5 G/DL
GAMMA GLOB FLD ELPH-MCNC: 0.3 G/DL
GAMMA GLOB MFR SERPL ELPH: 4.2 %
INTERPRETATION SERPL IEP-IMP: NORMAL
M PROTEIN MFR SERPL ELPH: 32.9 %
M PROTEIN SPEC IFE-MCNC: NORMAL
MONOCLON BAND OBS SERPL: 2.6 G/DL
PROT SERPL-MCNC: 7.8 G/DL
PROT SERPL-MCNC: 7.8 G/DL

## 2021-10-06 PROBLEM — I10 ESSENTIAL HYPERTENSION: Status: ACTIVE | Noted: 2020-12-14

## 2022-01-24 ENCOUNTER — APPOINTMENT (OUTPATIENT)
Dept: HEMATOLOGY ONCOLOGY | Facility: CLINIC | Age: 77
End: 2022-01-24
Payer: MEDICARE

## 2022-01-24 ENCOUNTER — OUTPATIENT (OUTPATIENT)
Dept: OUTPATIENT SERVICES | Facility: HOSPITAL | Age: 77
LOS: 1 days | Discharge: HOME | End: 2022-01-24

## 2022-01-24 ENCOUNTER — LABORATORY RESULT (OUTPATIENT)
Age: 77
End: 2022-01-24

## 2022-01-24 VITALS
HEIGHT: 70 IN | SYSTOLIC BLOOD PRESSURE: 180 MMHG | BODY MASS INDEX: 31.21 KG/M2 | RESPIRATION RATE: 14 BRPM | HEART RATE: 52 BPM | WEIGHT: 218 LBS | TEMPERATURE: 98.6 F | DIASTOLIC BLOOD PRESSURE: 76 MMHG

## 2022-01-24 DIAGNOSIS — C90.00 MULTIPLE MYELOMA NOT HAVING ACHIEVED REMISSION: ICD-10-CM

## 2022-01-24 LAB
ALBUMIN SERPL ELPH-MCNC: 3.7 G/DL
ALP BLD-CCNC: 61 U/L
ALT SERPL-CCNC: 15 U/L
ANION GAP SERPL CALC-SCNC: 19 MMOL/L
AST SERPL-CCNC: 19 U/L
BILIRUB SERPL-MCNC: 0.4 MG/DL
BUN SERPL-MCNC: 12 MG/DL
CALCIUM SERPL-MCNC: 9.1 MG/DL
CHLORIDE SERPL-SCNC: 98 MMOL/L
CO2 SERPL-SCNC: 23 MMOL/L
CREAT SERPL-MCNC: 1 MG/DL
GLUCOSE SERPL-MCNC: 95 MG/DL
HCT VFR BLD CALC: 36.8 %
HGB BLD-MCNC: 12.7 G/DL
LDH SERPL-CCNC: 137 U/L
MCHC RBC-ENTMCNC: 30.9 PG
MCHC RBC-ENTMCNC: 34.5 G/DL
MCV RBC AUTO: 89.5 FL
PLATELET # BLD AUTO: 200 K/UL
PMV BLD: 10.3 FL
POTASSIUM SERPL-SCNC: 3.9 MMOL/L
PROT SERPL-MCNC: 8.5 G/DL
RBC # BLD: 4.11 M/UL
RBC # FLD: 13.6 %
SODIUM SERPL-SCNC: 140 MMOL/L
WBC # FLD AUTO: 3.63 K/UL

## 2022-01-24 PROCEDURE — 99214 OFFICE O/P EST MOD 30 MIN: CPT

## 2022-01-24 NOTE — PHYSICAL EXAM
[Normal] : affect appropriate [Fully active, able to carry on all pre-disease performance without restriction] : Status 0 - Fully active, able to carry on all pre-disease performance without restriction [de-identified] : Mild arthritic changes.

## 2022-01-24 NOTE — HISTORY OF PRESENT ILLNESS
[Disease:__________________________] : Disease: [unfilled] [de-identified] : The patient is coming for his regularly scheduled follow up for his multiple myeloma with IgA lambda.\par The patient was recently seen by his urologist for his slightly elevated PSA at 4.2. MRI was obtained and reported as negative for any malignancy. \par Otherwise, the patient is denying any significant new changes.\par His IgA was a little lower; at his last visit it was 2676 down from 3506.\par Overall, he has no significant new complaint. He still has the same mild arthritic complaints. [de-identified] : IgA

## 2022-01-24 NOTE — ASSESSMENT
[FreeTextEntry1] : 1. Multiple myeloma with IgA lambda, clinically stable as well as with biologic markers.\par We will order CBC, CMP, LDH, IgA, SPEP with IFES.\par The patient has not shown clinical or biological progression with his hemogram remaining stable over even years and his IgA fluctuation between 2600 (his last measurement in July and 3520 back in November of last year).\par 2. Leukopenia, chronic, asymptomatic.\par 3. Borderline high PSA.He is followed regularly also by his urologist for his borderline PSA but negative MRI for malignancy.\par \par Further recommendations after the above results are available.\par \par If all stable again, he will be seen again in 6 months for follow up.\par

## 2022-01-24 NOTE — REVIEW OF SYSTEMS
[Joint Pain] : joint pain [Joint Stiffness] : joint stiffness [Negative] : Heme/Lymph [Recent Change In Weight] : ~T no recent weight change

## 2022-01-25 DIAGNOSIS — C90.00 MULTIPLE MYELOMA NOT HAVING ACHIEVED REMISSION: ICD-10-CM

## 2022-01-25 LAB
DEPRECATED KAPPA LC FREE/LAMBDA SER: 0.07 RATIO
IGA SER QL IEP: 3776 MG/DL
KAPPA LC CSF-MCNC: 9.23 MG/DL
KAPPA LC SERPL-MCNC: 0.61 MG/DL

## 2022-01-28 LAB
ALBUMIN MFR SERPL ELPH: 41.7 %
ALBUMIN SERPL-MCNC: 3.5 G/DL
ALBUMIN/GLOB SERPL: 0.7 RATIO
ALPHA1 GLOB MFR SERPL ELPH: 2.7 %
ALPHA1 GLOB SERPL ELPH-MCNC: 0.2 G/DL
ALPHA2 GLOB MFR SERPL ELPH: 7.9 %
ALPHA2 GLOB SERPL ELPH-MCNC: 0.7 G/DL
B-GLOBULIN MFR SERPL ELPH: 45.5 %
B-GLOBULIN SERPL ELPH-MCNC: 3.9 G/DL
GAMMA GLOB FLD ELPH-MCNC: 0.4 G/DL
GAMMA GLOB MFR SERPL ELPH: 4.3 %
INTERPRETATION SERPL IEP-IMP: NORMAL
M PROTEIN MFR SERPL ELPH: 33.9 %
M PROTEIN SPEC IFE-MCNC: NORMAL
MONOCLON BAND OBS SERPL: 2.9 G/DL
PROT SERPL-MCNC: 8.5 G/DL
PROT SERPL-MCNC: 8.5 G/DL

## 2022-07-25 ENCOUNTER — LABORATORY RESULT (OUTPATIENT)
Age: 77
End: 2022-07-25

## 2022-07-25 ENCOUNTER — OUTPATIENT (OUTPATIENT)
Dept: OUTPATIENT SERVICES | Facility: HOSPITAL | Age: 77
LOS: 1 days | Discharge: HOME | End: 2022-07-25

## 2022-07-25 ENCOUNTER — APPOINTMENT (OUTPATIENT)
Dept: HEMATOLOGY ONCOLOGY | Facility: CLINIC | Age: 77
End: 2022-07-25

## 2022-07-25 VITALS
WEIGHT: 220 LBS | DIASTOLIC BLOOD PRESSURE: 93 MMHG | SYSTOLIC BLOOD PRESSURE: 194 MMHG | HEART RATE: 56 BPM | BODY MASS INDEX: 31.5 KG/M2 | TEMPERATURE: 97.7 F | RESPIRATION RATE: 14 BRPM | HEIGHT: 70 IN

## 2022-07-25 LAB
ALBUMIN SERPL ELPH-MCNC: 3.6 G/DL
ALP BLD-CCNC: 63 U/L
ALT SERPL-CCNC: 14 U/L
ANION GAP SERPL CALC-SCNC: 15 MMOL/L
AST SERPL-CCNC: 20 U/L
BILIRUB SERPL-MCNC: 0.3 MG/DL
BUN SERPL-MCNC: 16 MG/DL
CALCIUM SERPL-MCNC: 8.9 MG/DL
CHLORIDE SERPL-SCNC: 99 MMOL/L
CO2 SERPL-SCNC: 26 MMOL/L
CREAT SERPL-MCNC: 0.9 MG/DL
EGFR: 90 ML/MIN/1.73M2
GLUCOSE SERPL-MCNC: 86 MG/DL
HCT VFR BLD CALC: 35.3 %
HGB BLD-MCNC: 12.1 G/DL
LDH SERPL-CCNC: 148 U/L
MCHC RBC-ENTMCNC: 30.9 PG
MCHC RBC-ENTMCNC: 34.3 G/DL
MCV RBC AUTO: 90.3 FL
PLATELET # BLD AUTO: 217 K/UL
PMV BLD: 9.7 FL
POTASSIUM SERPL-SCNC: 4.6 MMOL/L
PROT SERPL-MCNC: 8.2 G/DL
RBC # BLD: 3.91 M/UL
RBC # FLD: 13.2 %
SODIUM SERPL-SCNC: 140 MMOL/L
URATE SERPL-MCNC: 7.5 MG/DL
WBC # FLD AUTO: 4.49 K/UL

## 2022-07-25 PROCEDURE — 99214 OFFICE O/P EST MOD 30 MIN: CPT

## 2022-07-25 NOTE — HISTORY OF PRESENT ILLNESS
[Disease:__________________________] : Disease: [unfilled] [de-identified] : The patient is coming for his regularly scheduled follow up for his IgA lambda multiple myeloma, stage I. It has been stable for several years now.\par He has been in a lot of stress lately with family issues. He is also taking care of his 89 year of female. \par In addition, while losing weight, he developed right lower extremity pain which eventually resolved. He started taking an antiinflammatory medication for his gout and the pain apparently resolved. Had minimal swelling of the leg which also had improved.\par No new medications. \par Last colonoscopy was 2-3 years ago and it was negative.\par He was seen also by his neurologist and repeat EEG was negative. However, he was recommended to continue with his Keppra.

## 2022-07-25 NOTE — REVIEW OF SYSTEMS
[Loss of Hearing] : loss of hearing [Lower Ext Edema] : lower extremity edema [Joint Pain] : joint pain [Negative] : Heme/Lymph [de-identified] : But "restless sleep"

## 2022-07-25 NOTE — ASSESSMENT
[FreeTextEntry1] : IgA lambda multiple myeloma, stage I, clinically stable. on no treatment, although the paraproteinemia was progressing slowly over the last few years but had fluctuated between 2600's and 3700's.\par Originally the monoclonal IgA was detected in the mid-90's.\par The situation was discussed with the patient.\par We will repeat his work up including CBC, CMP, LDH, SPEP with IFES, IgA, Free light chains.\par \par Further recommendations after the above results are available.\par \par All questions answered.\par \par If all the above stable, he will be seen again in 6 months for follow up.

## 2022-07-25 NOTE — PHYSICAL EXAM
[Fully active, able to carry on all pre-disease performance without restriction] : Status 0 - Fully active, able to carry on all pre-disease performance without restriction [Normal] : affect appropriate [de-identified] : But somewhat overweight [de-identified] : May be trace edema right lower extremity [de-identified] : Some arthritic changes

## 2022-07-26 DIAGNOSIS — C90.00 MULTIPLE MYELOMA NOT HAVING ACHIEVED REMISSION: ICD-10-CM

## 2022-07-26 LAB
DEPRECATED KAPPA LC FREE/LAMBDA SER: 0.08 RATIO
IGA SER QL IEP: 3183 MG/DL
KAPPA LC CSF-MCNC: 8.92 MG/DL
KAPPA LC SERPL-MCNC: 0.7 MG/DL

## 2022-08-01 LAB
ALBUMIN MFR SERPL ELPH: 39.9 %
ALBUMIN SERPL-MCNC: 3.3 G/DL
ALBUMIN/GLOB SERPL: 0.7 RATIO
ALPHA1 GLOB MFR SERPL ELPH: 2.8 %
ALPHA1 GLOB SERPL ELPH-MCNC: 0.2 G/DL
ALPHA2 GLOB MFR SERPL ELPH: 7.7 %
ALPHA2 GLOB SERPL ELPH-MCNC: 0.6 G/DL
B-GLOBULIN MFR SERPL ELPH: 45.6 %
B-GLOBULIN SERPL ELPH-MCNC: 3.7 G/DL
DEPRECATED KAPPA LC FREE/LAMBDA SER: 0.08 RATIO
GAMMA GLOB FLD ELPH-MCNC: 0.3 G/DL
GAMMA GLOB MFR SERPL ELPH: 4 %
IGA SER QL IEP: 3183 MG/DL
IGG SER QL IEP: 367 MG/DL
IGM SER QL IEP: 38 MG/DL
INTERPRETATION SERPL IEP-IMP: NORMAL
KAPPA LC CSF-MCNC: 8.92 MG/DL
KAPPA LC SERPL-MCNC: 0.7 MG/DL
M PROTEIN MFR SERPL ELPH: 41.1 %
M PROTEIN SPEC IFE-MCNC: NORMAL
MONOCLON BAND OBS SERPL: 3.4 G/DL
PROT SERPL-MCNC: 8.2 G/DL

## 2023-01-23 ENCOUNTER — OUTPATIENT (OUTPATIENT)
Dept: OUTPATIENT SERVICES | Facility: HOSPITAL | Age: 78
LOS: 1 days | End: 2023-01-23

## 2023-01-23 ENCOUNTER — APPOINTMENT (OUTPATIENT)
Dept: HEMATOLOGY ONCOLOGY | Facility: CLINIC | Age: 78
End: 2023-01-23
Payer: MEDICARE

## 2023-01-23 ENCOUNTER — LABORATORY RESULT (OUTPATIENT)
Age: 78
End: 2023-01-23

## 2023-01-23 VITALS
HEIGHT: 70 IN | WEIGHT: 219 LBS | BODY MASS INDEX: 31.35 KG/M2 | DIASTOLIC BLOOD PRESSURE: 86 MMHG | TEMPERATURE: 98.6 F | SYSTOLIC BLOOD PRESSURE: 157 MMHG | HEART RATE: 60 BPM | RESPIRATION RATE: 16 BRPM

## 2023-01-23 LAB
ALBUMIN SERPL ELPH-MCNC: 3.6 G/DL
ALP BLD-CCNC: 66 U/L
ALT SERPL-CCNC: 14 U/L
ANION GAP SERPL CALC-SCNC: 12 MMOL/L
AST SERPL-CCNC: 19 U/L
BILIRUB SERPL-MCNC: 0.5 MG/DL
BUN SERPL-MCNC: 13 MG/DL
CALCIUM SERPL-MCNC: 8.9 MG/DL
CHLORIDE SERPL-SCNC: 101 MMOL/L
CO2 SERPL-SCNC: 28 MMOL/L
CREAT SERPL-MCNC: 1 MG/DL
EGFR: 78 ML/MIN/1.73M2
GLUCOSE SERPL-MCNC: 95 MG/DL
HCT VFR BLD CALC: 36.5 %
HGB BLD-MCNC: 12.2 G/DL
LDH SERPL-CCNC: 132 U/L
MCHC RBC-ENTMCNC: 29.8 PG
MCHC RBC-ENTMCNC: 33.4 G/DL
MCV RBC AUTO: 89.2 FL
PLATELET # BLD AUTO: 186 K/UL
PMV BLD: 10.4 FL
POTASSIUM SERPL-SCNC: 4.2 MMOL/L
PROT SERPL-MCNC: 8 G/DL
RBC # BLD: 4.09 M/UL
RBC # FLD: 13.4 %
SODIUM SERPL-SCNC: 141 MMOL/L
WBC # FLD AUTO: 3.66 K/UL

## 2023-01-23 PROCEDURE — 99214 OFFICE O/P EST MOD 30 MIN: CPT

## 2023-01-23 NOTE — HISTORY OF PRESENT ILLNESS
[Disease:__________________________] : Disease: [unfilled] [de-identified] : The patient is coming for his IgA multiple myeloma.\par His major complaints are related to pain in his right thumb and bunions in his feet which may be reducing the scope of his activities.\par Denying any fever or night sweats. No problems with urine or bowel problems. \par No cough or shortness of breath.\par The appetite is good.\par His IgA has been fluctuating in the low and mid 3100 to 3700 range (the last one from past July being down to 3183 with an M-spike around 3.4 and a kappa/lambda ratio of 0.08 but lambda light chain of only 8.92 and kappa light chain 0.70).

## 2023-01-23 NOTE — PHYSICAL EXAM
[Fully active, able to carry on all pre-disease performance without restriction] : Status 0 - Fully active, able to carry on all pre-disease performance without restriction [Normal] : affect appropriate [de-identified] : But somewhat overweight [de-identified] : But somewhat decreased hearing [de-identified] : Arthritic changes

## 2023-01-23 NOTE — ASSESSMENT
[FreeTextEntry1] : IgA myeloma, clinically stable. No new symptoms. The patient is remaining essentially asymptomatic.\par The situation was discussed with the patient.\par We will repeat the myeloma work up (see below).\par Further recommendations after the above results are available.\par \par All questions answered.\par \par If all stable, the patient will be seen again in 5-6 months for follow up, or sooner if he develops new symptoms.

## 2023-01-24 DIAGNOSIS — C90.00 MULTIPLE MYELOMA NOT HAVING ACHIEVED REMISSION: ICD-10-CM

## 2023-01-24 LAB
DEPRECATED KAPPA LC FREE/LAMBDA SER: 0.08 RATIO
IGA SER QL IEP: 2826 MG/DL
KAPPA LC CSF-MCNC: 11.59 MG/DL
KAPPA LC SERPL-MCNC: 0.91 MG/DL

## 2023-01-27 LAB
ALBUMIN MFR SERPL ELPH: 41.9 %
ALBUMIN SERPL-MCNC: 3.3 G/DL
ALBUMIN/GLOB SERPL: 0.7 RATIO
ALPHA1 GLOB MFR SERPL ELPH: 3.1 %
ALPHA1 GLOB SERPL ELPH-MCNC: 0.2 G/DL
ALPHA2 GLOB MFR SERPL ELPH: 8.5 %
ALPHA2 GLOB SERPL ELPH-MCNC: 0.7 G/DL
B-GLOBULIN MFR SERPL ELPH: 42.7 %
B-GLOBULIN SERPL ELPH-MCNC: 3.3 G/DL
GAMMA GLOB FLD ELPH-MCNC: 0.3 G/DL
GAMMA GLOB MFR SERPL ELPH: 3.8 %
INTERPRETATION SERPL IEP-IMP: NORMAL
M PROTEIN MFR SERPL ELPH: 36.6 %
M PROTEIN SPEC IFE-MCNC: NORMAL
MONOCLON BAND OBS SERPL: 2.9 G/DL
PROT SERPL-MCNC: 7.8 G/DL
PROT SERPL-MCNC: 7.8 G/DL

## 2023-08-01 ENCOUNTER — LABORATORY RESULT (OUTPATIENT)
Age: 78
End: 2023-08-01

## 2023-08-01 ENCOUNTER — APPOINTMENT (OUTPATIENT)
Dept: HEMATOLOGY ONCOLOGY | Facility: CLINIC | Age: 78
End: 2023-08-01
Payer: MEDICARE

## 2023-08-01 ENCOUNTER — OUTPATIENT (OUTPATIENT)
Dept: OUTPATIENT SERVICES | Facility: HOSPITAL | Age: 78
LOS: 1 days | End: 2023-08-01
Payer: MEDICARE

## 2023-08-01 VITALS
WEIGHT: 220 LBS | HEIGHT: 70 IN | TEMPERATURE: 98 F | SYSTOLIC BLOOD PRESSURE: 190 MMHG | DIASTOLIC BLOOD PRESSURE: 88 MMHG | BODY MASS INDEX: 31.5 KG/M2 | HEART RATE: 56 BPM

## 2023-08-01 DIAGNOSIS — C90.00 MULTIPLE MYELOMA NOT HAVING ACHIEVED REMISSION: ICD-10-CM

## 2023-08-01 LAB
ALBUMIN SERPL ELPH-MCNC: 3.9 G/DL
ALP BLD-CCNC: 60 U/L
ALT SERPL-CCNC: 14 U/L
ANION GAP SERPL CALC-SCNC: 12 MMOL/L
AST SERPL-CCNC: 17 U/L
BILIRUB SERPL-MCNC: 0.4 MG/DL
BUN SERPL-MCNC: 14 MG/DL
CALCIUM SERPL-MCNC: 9.6 MG/DL
CHLORIDE SERPL-SCNC: 99 MMOL/L
CO2 SERPL-SCNC: 29 MMOL/L
CREAT SERPL-MCNC: 0.9 MG/DL
EGFR: 89 ML/MIN/1.73M2
GLUCOSE SERPL-MCNC: 97 MG/DL
HCT VFR BLD CALC: 36.8 %
HGB BLD-MCNC: 12.5 G/DL
LDH SERPL-CCNC: 120 U/L
MCHC RBC-ENTMCNC: 30.6 PG
MCHC RBC-ENTMCNC: 34 G/DL
MCV RBC AUTO: 90 FL
PLATELET # BLD AUTO: 201 K/UL
PMV BLD: 10.5 FL
POTASSIUM SERPL-SCNC: 4.2 MMOL/L
PROT SERPL-MCNC: 8.3 G/DL
RBC # BLD: 4.09 M/UL
RBC # FLD: 13.4 %
SODIUM SERPL-SCNC: 140 MMOL/L
WBC # FLD AUTO: 3.67 K/UL

## 2023-08-01 PROCEDURE — 86334 IMMUNOFIX E-PHORESIS SERUM: CPT

## 2023-08-01 PROCEDURE — 83521 IG LIGHT CHAINS FREE EACH: CPT

## 2023-08-01 PROCEDURE — 84155 ASSAY OF PROTEIN SERUM: CPT

## 2023-08-01 PROCEDURE — 80053 COMPREHEN METABOLIC PANEL: CPT

## 2023-08-01 PROCEDURE — 99214 OFFICE O/P EST MOD 30 MIN: CPT

## 2023-08-01 PROCEDURE — 85027 COMPLETE CBC AUTOMATED: CPT

## 2023-08-01 PROCEDURE — 84165 PROTEIN E-PHORESIS SERUM: CPT

## 2023-08-01 PROCEDURE — 83615 LACTATE (LD) (LDH) ENZYME: CPT

## 2023-08-01 PROCEDURE — 82784 ASSAY IGA/IGD/IGG/IGM EACH: CPT

## 2023-08-01 NOTE — ASSESSMENT
[FreeTextEntry1] : IgA lambda multiple myeloma, smoldering, clinically stable, on no treatment for the time being. The situation was discussed with the patient. Will reevaluate the disease status with CBC, CMP, LDH, SPEP with IFES, free light chains, quantitative IgA. Further recommendations after the above completed. If all stable, the patient will be seen again in 6 months for follow up.  All questions answered.

## 2023-08-01 NOTE — HISTORY OF PRESENT ILLNESS
[Disease:__________________________] : Disease: [unfilled] [de-identified] : The patient is coming for his regularly scheduled follow-up for his IgA lambda multiple myeloma continuing on observation. Presently, he has no new particular complaints except her chronic joint aches especially of the lower extremities. He is on no new medications. [de-identified] : IgA lambda

## 2023-08-01 NOTE — PHYSICAL EXAM
[Fully active, able to carry on all pre-disease performance without restriction] : Status 0 - Fully active, able to carry on all pre-disease performance without restriction [Obese] : obese [Normal] : affect appropriate [de-identified] : Just mild arthritic changes

## 2023-08-02 DIAGNOSIS — C90.00 MULTIPLE MYELOMA NOT HAVING ACHIEVED REMISSION: ICD-10-CM

## 2023-08-02 LAB
DEPRECATED KAPPA LC FREE/LAMBDA SER: 0.07 RATIO
IGA SER QL IEP: 3276 MG/DL
KAPPA LC CSF-MCNC: 9.44 MG/DL
KAPPA LC SERPL-MCNC: 0.66 MG/DL

## 2023-08-03 LAB
ALBUMIN MFR SERPL ELPH: 42.3 %
ALBUMIN SERPL-MCNC: 3.5 G/DL
ALBUMIN/GLOB SERPL: 0.7 RATIO
ALPHA1 GLOB MFR SERPL ELPH: 2.9 %
ALPHA1 GLOB SERPL ELPH-MCNC: 0.2 G/DL
ALPHA2 GLOB MFR SERPL ELPH: 8 %
ALPHA2 GLOB SERPL ELPH-MCNC: 0.7 G/DL
B-GLOBULIN MFR SERPL ELPH: 42.9 %
B-GLOBULIN SERPL ELPH-MCNC: 3.5 G/DL
GAMMA GLOB FLD ELPH-MCNC: 0.3 G/DL
GAMMA GLOB MFR SERPL ELPH: 3.9 %
INTERPRETATION SERPL IEP-IMP: NORMAL
M PROTEIN MFR SERPL ELPH: 39.7 %
M PROTEIN SPEC IFE-MCNC: NORMAL
MONOCLON BAND OBS SERPL: 3.3 G/DL
PROT SERPL-MCNC: 8.2 G/DL
PROT SERPL-MCNC: 8.2 G/DL

## 2023-08-14 ENCOUNTER — TRANSCRIPTION ENCOUNTER (OUTPATIENT)
Age: 78
End: 2023-08-14

## 2024-02-05 ENCOUNTER — OUTPATIENT (OUTPATIENT)
Dept: OUTPATIENT SERVICES | Facility: HOSPITAL | Age: 79
LOS: 1 days | End: 2024-02-05
Payer: MEDICARE

## 2024-02-05 ENCOUNTER — LABORATORY RESULT (OUTPATIENT)
Age: 79
End: 2024-02-05

## 2024-02-05 ENCOUNTER — APPOINTMENT (OUTPATIENT)
Dept: HEMATOLOGY ONCOLOGY | Facility: CLINIC | Age: 79
End: 2024-02-05
Payer: MEDICARE

## 2024-02-05 VITALS
HEIGHT: 70 IN | BODY MASS INDEX: 31.5 KG/M2 | SYSTOLIC BLOOD PRESSURE: 191 MMHG | RESPIRATION RATE: 18 BRPM | TEMPERATURE: 98.6 F | WEIGHT: 220 LBS | OXYGEN SATURATION: 99 % | DIASTOLIC BLOOD PRESSURE: 100 MMHG

## 2024-02-05 DIAGNOSIS — C90.00 MULTIPLE MYELOMA NOT HAVING ACHIEVED REMISSION: ICD-10-CM

## 2024-02-05 DIAGNOSIS — D64.9 ANEMIA, UNSPECIFIED: ICD-10-CM

## 2024-02-05 LAB
ALBUMIN SERPL ELPH-MCNC: 3.7 G/DL
ALP BLD-CCNC: 58 U/L
ALT SERPL-CCNC: 14 U/L
ANION GAP SERPL CALC-SCNC: 17 MMOL/L
AST SERPL-CCNC: 18 U/L
BILIRUB SERPL-MCNC: 0.5 MG/DL
BUN SERPL-MCNC: 13 MG/DL
CALCIUM SERPL-MCNC: 9.4 MG/DL
CHLORIDE SERPL-SCNC: 98 MMOL/L
CO2 SERPL-SCNC: 25 MMOL/L
CREAT SERPL-MCNC: 0.9 MG/DL
EGFR: 89 ML/MIN/1.73M2
GLUCOSE SERPL-MCNC: 102 MG/DL
HCT VFR BLD CALC: 35.2 %
HGB BLD-MCNC: 12.3 G/DL
LDH SERPL-CCNC: 123 U/L
MCHC RBC-ENTMCNC: 31.3 PG
MCHC RBC-ENTMCNC: 34.9 G/DL
MCV RBC AUTO: 89.6 FL
PLATELET # BLD AUTO: 196 K/UL
PMV BLD: 10 FL
POTASSIUM SERPL-SCNC: 4.3 MMOL/L
PROT SERPL-MCNC: 8.2 G/DL
RBC # BLD: 3.93 M/UL
RBC # FLD: 13.3 %
SODIUM SERPL-SCNC: 140 MMOL/L
WBC # FLD AUTO: 4.13 K/UL

## 2024-02-05 PROCEDURE — 86334 IMMUNOFIX E-PHORESIS SERUM: CPT

## 2024-02-05 PROCEDURE — 84155 ASSAY OF PROTEIN SERUM: CPT

## 2024-02-05 PROCEDURE — 83521 IG LIGHT CHAINS FREE EACH: CPT

## 2024-02-05 PROCEDURE — 80053 COMPREHEN METABOLIC PANEL: CPT

## 2024-02-05 PROCEDURE — 85027 COMPLETE CBC AUTOMATED: CPT

## 2024-02-05 PROCEDURE — 99214 OFFICE O/P EST MOD 30 MIN: CPT

## 2024-02-05 PROCEDURE — 82784 ASSAY IGA/IGD/IGG/IGM EACH: CPT

## 2024-02-05 PROCEDURE — 84165 PROTEIN E-PHORESIS SERUM: CPT

## 2024-02-05 PROCEDURE — 83615 LACTATE (LD) (LDH) ENZYME: CPT

## 2024-02-05 NOTE — END OF VISIT
[FreeTextEntry3] : I was present with the PA during the key portions of the history and exam. I agree with the findings and plan as documented in the PA's note, unless noted below.

## 2024-02-05 NOTE — ASSESSMENT
[FreeTextEntry1] : IgA lambda multiple myeloma, smoldering, clinically stable, on no treatment for the time being.  The situation was discussed with the patient. Will reevaluate the disease status with CBC, CMP, LDH, SPEP with IFES, free light chains, quantitative IgA. Further recommendations after the above completed. If all stable, the patient will be seen again in 6 months for follow up.  All questions answered.  Case discussed and patient seen with Dr. Drake.

## 2024-02-05 NOTE — PHYSICAL EXAM
[Fully active, able to carry on all pre-disease performance without restriction] : Status 0 - Fully active, able to carry on all pre-disease performance without restriction [Obese] : obese [Normal] : affect appropriate [de-identified] : ? tumefaction of the parotid glands [de-identified] : trace edema B/L [de-identified] : Just mild arthritic changes

## 2024-02-05 NOTE — REVIEW OF SYSTEMS
[Joint Pain] : joint pain [Negative] : Heme/Lymph [FreeTextEntry9] : Especially lower extremities, arthritis

## 2024-02-05 NOTE — HISTORY OF PRESENT ILLNESS
[Disease:__________________________] : Disease: [unfilled] [de-identified] : The patient is coming for his regularly scheduled follow-up for his IgA lambda multiple myeloma continuing on observation. Presently, he has no new particular complaints. Overall he is doing well. He is on no new medications. [de-identified] : IgA lambda

## 2024-02-06 DIAGNOSIS — D64.9 ANEMIA, UNSPECIFIED: ICD-10-CM

## 2024-02-06 LAB
DEPRECATED KAPPA LC FREE/LAMBDA SER: 0.09 RATIO
IGA SER QL IEP: 3901 MG/DL
KAPPA LC CSF-MCNC: 8.82 MG/DL
KAPPA LC SERPL-MCNC: 0.81 MG/DL

## 2024-02-08 LAB
ALBUMIN MFR SERPL ELPH: 42.1 %
ALBUMIN SERPL-MCNC: 3.5 G/DL
ALBUMIN/GLOB SERPL: 0.7 RATIO
ALPHA1 GLOB MFR SERPL ELPH: 2.8 %
ALPHA1 GLOB SERPL ELPH-MCNC: 0.2 G/DL
ALPHA2 GLOB MFR SERPL ELPH: 7.9 %
ALPHA2 GLOB SERPL ELPH-MCNC: 0.6 G/DL
B-GLOBULIN MFR SERPL ELPH: 42.3 %
B-GLOBULIN SERPL ELPH-MCNC: 3.5 G/DL
GAMMA GLOB FLD ELPH-MCNC: 0.4 G/DL
GAMMA GLOB MFR SERPL ELPH: 4.9 %
INTERPRETATION SERPL IEP-IMP: NORMAL
M PROTEIN MFR SERPL ELPH: 38.6 %
M PROTEIN SPEC IFE-MCNC: NORMAL
MONOCLON BAND OBS SERPL: 3.2 G/DL
PROT SERPL-MCNC: 8.2 G/DL

## 2024-08-19 ENCOUNTER — OUTPATIENT (OUTPATIENT)
Dept: OUTPATIENT SERVICES | Facility: HOSPITAL | Age: 79
LOS: 1 days | End: 2024-08-19
Payer: MEDICARE

## 2024-08-19 ENCOUNTER — LABORATORY RESULT (OUTPATIENT)
Age: 79
End: 2024-08-19

## 2024-08-19 ENCOUNTER — APPOINTMENT (OUTPATIENT)
Age: 79
End: 2024-08-19

## 2024-08-19 VITALS
WEIGHT: 221 LBS | DIASTOLIC BLOOD PRESSURE: 92 MMHG | RESPIRATION RATE: 16 BRPM | HEART RATE: 70 BPM | OXYGEN SATURATION: 96 % | SYSTOLIC BLOOD PRESSURE: 176 MMHG | BODY MASS INDEX: 31.64 KG/M2 | TEMPERATURE: 97.8 F | HEIGHT: 70 IN

## 2024-08-19 DIAGNOSIS — D64.9 ANEMIA, UNSPECIFIED: ICD-10-CM

## 2024-08-19 DIAGNOSIS — C90.00 MULTIPLE MYELOMA NOT HAVING ACHIEVED REMISSION: ICD-10-CM

## 2024-08-19 LAB
ALBUMIN SERPL ELPH-MCNC: 3.7 G/DL
ALP BLD-CCNC: 63 U/L
ALT SERPL-CCNC: 12 U/L
ANION GAP SERPL CALC-SCNC: 15 MMOL/L
AST SERPL-CCNC: 18 U/L
BILIRUB SERPL-MCNC: 0.4 MG/DL
BUN SERPL-MCNC: 13 MG/DL
CALCIUM SERPL-MCNC: 9.2 MG/DL
CHLORIDE SERPL-SCNC: 98 MMOL/L
CO2 SERPL-SCNC: 27 MMOL/L
CREAT SERPL-MCNC: 1 MG/DL
EGFR: 78 ML/MIN/1.73M2
GLUCOSE SERPL-MCNC: 94 MG/DL
HCT VFR BLD CALC: 33.5 %
HGB BLD-MCNC: 11.7 G/DL
LDH SERPL-CCNC: 141 U/L
MCHC RBC-ENTMCNC: 30.4 PG
MCHC RBC-ENTMCNC: 34.9 G/DL
MCV RBC AUTO: 87 FL
PLATELET # BLD AUTO: 193 K/UL
PMV BLD: 10.1 FL
POTASSIUM SERPL-SCNC: 3.7 MMOL/L
PROT SERPL-MCNC: 7.9 G/DL
RBC # BLD: 3.85 M/UL
RBC # FLD: 13.9 %
SODIUM SERPL-SCNC: 140 MMOL/L
WBC # FLD AUTO: 4.1 K/UL

## 2024-08-19 PROCEDURE — 83521 IG LIGHT CHAINS FREE EACH: CPT

## 2024-08-19 PROCEDURE — 99214 OFFICE O/P EST MOD 30 MIN: CPT

## 2024-08-19 PROCEDURE — 80053 COMPREHEN METABOLIC PANEL: CPT

## 2024-08-19 PROCEDURE — 83615 LACTATE (LD) (LDH) ENZYME: CPT

## 2024-08-19 PROCEDURE — 82784 ASSAY IGA/IGD/IGG/IGM EACH: CPT

## 2024-08-19 PROCEDURE — 84165 PROTEIN E-PHORESIS SERUM: CPT

## 2024-08-19 PROCEDURE — 85027 COMPLETE CBC AUTOMATED: CPT

## 2024-08-19 PROCEDURE — 84155 ASSAY OF PROTEIN SERUM: CPT

## 2024-08-19 PROCEDURE — 86334 IMMUNOFIX E-PHORESIS SERUM: CPT

## 2024-08-19 NOTE — ASSESSMENT
[FreeTextEntry1] : -- IgA lambda multiple myeloma, smoldering, clinically stable, on no treatment for the time being. -- R renal oncocytoma, being followed at [facility].   The situation was discussed with the patient. Will reevaluate the disease status with CBC, CMP, LDH, SPEP with IFES, free light chains, quantitative IgA. Further recommendations after the above completed. If all stable, the patient will be seen again in 6 months for follow up. All questions answered.  Case discussed and patient seen with Dr. Drake who agreed with the above.

## 2024-08-19 NOTE — HISTORY OF PRESENT ILLNESS
[Disease:__________________________] : Disease: [unfilled] [de-identified] : The patient is coming for his regularly scheduled follow-up for his IgA lambda multiple myeloma continuing on observation. Presently, he has no new particular complaints. Overall he is doing well. He was recently found to have R renal mass, status post partial nephrectomy at [facility]. Pathology consistent with oncocytoma. He will be followed by repeat CT scan scheduled for December. He is on no new medications. [de-identified] : IgA lambda

## 2024-08-19 NOTE — PHYSICAL EXAM
[Fully active, able to carry on all pre-disease performance without restriction] : Status 0 - Fully active, able to carry on all pre-disease performance without restriction [Obese] : obese [Normal] : affect appropriate [de-identified] : submandibular soft movable lipoma [de-identified] : trace edema B/L [de-identified] : Just mild arthritic changes

## 2024-08-19 NOTE — HISTORY OF PRESENT ILLNESS
[Disease:__________________________] : Disease: [unfilled] [de-identified] : The patient is coming for his regularly scheduled follow-up for his IgA lambda multiple myeloma continuing on observation. Presently, he has no new particular complaints. Overall he is doing well. He was recently found to have R renal mass, status post partial nephrectomy at [facility]. Pathology consistent with oncocytoma. He will be followed by repeat CT scan scheduled for December. He is on no new medications. [de-identified] : IgA lambda

## 2024-08-19 NOTE — ASSESSMENT
[FreeTextEntry1] : -- IgA lambda multiple myeloma, smoldering, clinically stable, on no treatment for the time being. 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   The situation was discussed with the patient. Will reevaluate the disease status with CBC, CMP, LDH, SPEP with IFES, free light chains, quantitative IgA. Further recommendations after the above completed. If all stable, the patient will be seen again in 6 months for follow up. All questions answered.  Case discussed and patient seen with Dr. Drake who agreed with the above.

## 2024-08-19 NOTE — PHYSICAL EXAM
[Fully active, able to carry on all pre-disease performance without restriction] : Status 0 - Fully active, able to carry on all pre-disease performance without restriction [Obese] : obese [Normal] : affect appropriate [de-identified] : submandibular soft movable lipoma [de-identified] : trace edema B/L [de-identified] : Just mild arthritic changes

## 2024-08-20 DIAGNOSIS — D64.9 ANEMIA, UNSPECIFIED: ICD-10-CM

## 2024-08-20 LAB
DEPRECATED KAPPA LC FREE/LAMBDA SER: 0.07 RATIO
IGA SER QL IEP: 2944 MG/DL
KAPPA LC CSF-MCNC: 9.1 MG/DL
KAPPA LC SERPL-MCNC: 0.61 MG/DL

## 2024-08-21 LAB
ALBUMIN MFR SERPL ELPH: 42.5 %
ALBUMIN SERPL-MCNC: 3.4 G/DL
ALBUMIN/GLOB SERPL: 0.7 RATIO
ALPHA1 GLOB MFR SERPL ELPH: 2.8 %
ALPHA1 GLOB SERPL ELPH-MCNC: 0.2 G/DL
ALPHA2 GLOB MFR SERPL ELPH: 7.9 %
ALPHA2 GLOB SERPL ELPH-MCNC: 0.6 G/DL
B-GLOBULIN MFR SERPL ELPH: 42.5 %
B-GLOBULIN SERPL ELPH-MCNC: 3.4 G/DL
GAMMA GLOB FLD ELPH-MCNC: 0.3 G/DL
GAMMA GLOB MFR SERPL ELPH: 4.3 %
INTERPRETATION SERPL IEP-IMP: NORMAL
M PROTEIN MFR SERPL ELPH: 38.8 %
M PROTEIN SPEC IFE-MCNC: NORMAL
MONOCLON BAND OBS SERPL: 3.1 G/DL
PROT SERPL-MCNC: 8.1 G/DL
PROT SERPL-MCNC: 8.1 G/DL

## 2025-02-14 ENCOUNTER — OUTPATIENT (OUTPATIENT)
Dept: OUTPATIENT SERVICES | Facility: HOSPITAL | Age: 80
LOS: 1 days | End: 2025-02-14
Payer: MEDICARE

## 2025-02-14 ENCOUNTER — APPOINTMENT (OUTPATIENT)
Age: 80
End: 2025-02-14
Payer: MEDICARE

## 2025-02-14 VITALS
SYSTOLIC BLOOD PRESSURE: 174 MMHG | BODY MASS INDEX: 31.78 KG/M2 | TEMPERATURE: 97.6 F | OXYGEN SATURATION: 99 % | RESPIRATION RATE: 16 BRPM | HEIGHT: 70 IN | DIASTOLIC BLOOD PRESSURE: 91 MMHG | WEIGHT: 222 LBS | HEART RATE: 60 BPM

## 2025-02-14 DIAGNOSIS — C90.00 MULTIPLE MYELOMA NOT HAVING ACHIEVED REMISSION: ICD-10-CM

## 2025-02-14 DIAGNOSIS — D64.9 ANEMIA, UNSPECIFIED: ICD-10-CM

## 2025-02-14 LAB
ALBUMIN SERPL ELPH-MCNC: 3.8 G/DL
ALP BLD-CCNC: 71 U/L
ALT SERPL-CCNC: 12 U/L
ANION GAP SERPL CALC-SCNC: 16 MMOL/L
AST SERPL-CCNC: 18 U/L
AUTO BASOPHILS #: 0.02 K/UL
AUTO BASOPHILS %: 0.5 %
AUTO EOSINOPHILS #: 0.25 K/UL
AUTO EOSINOPHILS %: 6 %
AUTO IMMATURE GRANULOCYTES #: 0.01 K/UL
AUTO LYMPHOCYTES #: 1.15 K/UL
AUTO LYMPHOCYTES %: 27.4 %
AUTO MONOCYTES #: 0.46 K/UL
AUTO MONOCYTES %: 11 %
AUTO NEUTROPHILS #: 2.31 K/UL
AUTO NEUTROPHILS %: 54.9 %
AUTO NRBC #: 0 K/UL
BILIRUB SERPL-MCNC: 0.5 MG/DL
BUN SERPL-MCNC: 13 MG/DL
CALCIUM SERPL-MCNC: 9.1 MG/DL
CHLORIDE SERPL-SCNC: 98 MMOL/L
CO2 SERPL-SCNC: 25 MMOL/L
CREAT SERPL-MCNC: 0.9 MG/DL
EGFR: 88 ML/MIN/1.73M2
GLUCOSE SERPL-MCNC: 86 MG/DL
HCT VFR BLD CALC: 37 %
HGB BLD-MCNC: 12.7 G/DL
IMM GRANULOCYTES NFR BLD AUTO: 0.2 %
LDH SERPL-CCNC: 141 U/L
MAN DIFF?: NORMAL
MCHC RBC-ENTMCNC: 31 PG
MCHC RBC-ENTMCNC: 34.3 G/DL
MCV RBC AUTO: 90.2 FL
PLATELET # BLD AUTO: 195 K/UL
PMV BLD AUTO: 0 /100 WBCS
PMV BLD: 10.3 FL
POTASSIUM SERPL-SCNC: 3.9 MMOL/L
PROT SERPL-MCNC: 8 G/DL
RBC # BLD: 4.1 M/UL
RBC # FLD: 13.3 %
SODIUM SERPL-SCNC: 139 MMOL/L
WBC # FLD AUTO: 4.2 K/UL

## 2025-02-14 PROCEDURE — 84155 ASSAY OF PROTEIN SERUM: CPT

## 2025-02-14 PROCEDURE — 99214 OFFICE O/P EST MOD 30 MIN: CPT

## 2025-02-14 PROCEDURE — 83615 LACTATE (LD) (LDH) ENZYME: CPT

## 2025-02-14 PROCEDURE — 82784 ASSAY IGA/IGD/IGG/IGM EACH: CPT

## 2025-02-14 PROCEDURE — 80053 COMPREHEN METABOLIC PANEL: CPT

## 2025-02-14 PROCEDURE — 85025 COMPLETE CBC W/AUTO DIFF WBC: CPT

## 2025-02-14 PROCEDURE — 84165 PROTEIN E-PHORESIS SERUM: CPT

## 2025-02-14 PROCEDURE — 83521 IG LIGHT CHAINS FREE EACH: CPT

## 2025-02-14 PROCEDURE — 86334 IMMUNOFIX E-PHORESIS SERUM: CPT

## 2025-02-15 DIAGNOSIS — D64.9 ANEMIA, UNSPECIFIED: ICD-10-CM

## 2025-02-18 LAB
DEPRECATED KAPPA LC FREE/LAMBDA SER: 0.18 RATIO
IGA SER QL IEP: 2774 MG/DL
KAPPA LC CSF-MCNC: 10.07 MG/DL
KAPPA LC SERPL-MCNC: 1.86 MG/DL

## 2025-02-21 LAB
ALBUMIN MFR SERPL ELPH: 43.1 %
ALBUMIN SERPL-MCNC: 3.4 G/DL
ALBUMIN/GLOB SERPL: 0.7 RATIO
ALPHA1 GLOB MFR SERPL ELPH: 2.7 %
ALPHA1 GLOB SERPL ELPH-MCNC: 0.2 G/DL
ALPHA2 GLOB MFR SERPL ELPH: 8.1 %
ALPHA2 GLOB SERPL ELPH-MCNC: 0.6 G/DL
B-GLOBULIN MFR SERPL ELPH: 41.9 %
B-GLOBULIN SERPL ELPH-MCNC: 3.4 G/DL
GAMMA GLOB FLD ELPH-MCNC: 0.3 G/DL
GAMMA GLOB MFR SERPL ELPH: 4.2 %
INTERPRETATION SERPL IEP-IMP: NORMAL
M PROTEIN MFR SERPL ELPH: 37.5 %
M PROTEIN SPEC IFE-MCNC: NORMAL
MONOCLON BAND OBS SERPL: 3 G/DL
PROT SERPL-MCNC: 8 G/DL
PROT SERPL-MCNC: 8 G/DL

## 2025-08-18 ENCOUNTER — APPOINTMENT (OUTPATIENT)
Age: 80
End: 2025-08-18
Payer: MEDICARE

## 2025-08-18 VITALS
SYSTOLIC BLOOD PRESSURE: 168 MMHG | TEMPERATURE: 97.8 F | DIASTOLIC BLOOD PRESSURE: 78 MMHG | HEART RATE: 60 BPM | OXYGEN SATURATION: 99 % | RESPIRATION RATE: 16 BRPM

## 2025-08-18 DIAGNOSIS — D47.2 MONOCLONAL GAMMOPATHY: ICD-10-CM

## 2025-08-18 LAB
ALBUMIN SERPL ELPH-MCNC: 4 G/DL
ALP BLD-CCNC: 67 U/L
ALT SERPL-CCNC: 12 U/L
ANION GAP SERPL CALC-SCNC: 15 MMOL/L
AST SERPL-CCNC: 18 U/L
AUTO BASOPHILS #: 0.03 K/UL
AUTO BASOPHILS %: 0.7 %
AUTO EOSINOPHILS #: 0.21 K/UL
AUTO EOSINOPHILS %: 4.7 %
AUTO IMMATURE GRANULOCYTES #: 0.01 K/UL
AUTO LYMPHOCYTES #: 1.15 K/UL
AUTO LYMPHOCYTES %: 25.8 %
AUTO MONOCYTES #: 0.44 K/UL
AUTO MONOCYTES %: 9.9 %
AUTO NEUTROPHILS #: 2.61 K/UL
AUTO NEUTROPHILS %: 58.7 %
AUTO NRBC #: 0 K/UL
BILIRUB SERPL-MCNC: 0.6 MG/DL
BUN SERPL-MCNC: 15 MG/DL
CALCIUM SERPL-MCNC: 9.6 MG/DL
CHLORIDE SERPL-SCNC: 99 MMOL/L
CO2 SERPL-SCNC: 26 MMOL/L
CREAT SERPL-MCNC: 1.1 MG/DL
EGFRCR SERPLBLD CKD-EPI 2021: 69 ML/MIN/1.73M2
GLUCOSE SERPL-MCNC: 100 MG/DL
HCT VFR BLD CALC: 37.7 %
HGB BLD-MCNC: 12.7 G/DL
IMM GRANULOCYTES NFR BLD AUTO: 0.2 %
LDH SERPL-CCNC: 132 U/L
MAN DIFF?: NORMAL
MCHC RBC-ENTMCNC: 30.7 PG
MCHC RBC-ENTMCNC: 33.7 G/DL
MCV RBC AUTO: 91.1 FL
PLATELET # BLD AUTO: 203 K/UL
PMV BLD AUTO: 0 /100 WBCS
PMV BLD: 10.1 FL
POTASSIUM SERPL-SCNC: 4.6 MMOL/L
PROT SERPL-MCNC: 8.7 G/DL
RBC # BLD: 4.14 M/UL
RBC # FLD: 13.7 %
SODIUM SERPL-SCNC: 140 MMOL/L
WBC # FLD AUTO: 4.45 K/UL

## 2025-08-18 PROCEDURE — 99214 OFFICE O/P EST MOD 30 MIN: CPT

## 2025-08-20 LAB
ALBUMIN MFR SERPL ELPH: 43.8 %
ALBUMIN SERPL-MCNC: 3.7 G/DL
ALBUMIN/GLOB SERPL: 0.8 RATIO
ALPHA1 GLOB MFR SERPL ELPH: 2.5 %
ALPHA1 GLOB SERPL ELPH-MCNC: 0.2 G/DL
ALPHA2 GLOB MFR SERPL ELPH: 8 %
ALPHA2 GLOB SERPL ELPH-MCNC: 0.7 G/DL
B-GLOBULIN MFR SERPL ELPH: 41.7 %
B-GLOBULIN SERPL ELPH-MCNC: 3.5 G/DL
DEPRECATED KAPPA LC FREE/LAMBDA SER: 0.05 RATIO
GAMMA GLOB FLD ELPH-MCNC: 0.3 G/DL
GAMMA GLOB MFR SERPL ELPH: 4 %
IGA SERPL-MCNC: 3063 MG/DL
INTERPRETATION SERPL IEP-IMP: NORMAL
KAPPA LC CSF-MCNC: 11.53 MG/DL
KAPPA LC SERPL-MCNC: 0.58 MG/DL
M PROTEIN MFR SERPL ELPH: 32.5 %
M PROTEIN SPEC IFE-MCNC: NORMAL
MONOCLON BAND OBS SERPL: 2.7 G/DL
PROT SERPL-MCNC: 8.4 G/DL
PROT SERPL-MCNC: 8.4 G/DL

## 2025-09-02 RX ORDER — CARVEDILOL 25 MG/1
25 TABLET, FILM COATED ORAL TWICE DAILY
Refills: 0 | Status: ACTIVE | COMMUNITY

## 2025-09-02 RX ORDER — LEVETIRACETAM 500 MG/1
500 TABLET, FILM COATED ORAL TWICE DAILY
Refills: 0 | Status: ACTIVE | COMMUNITY